# Patient Record
Sex: FEMALE | Race: WHITE | NOT HISPANIC OR LATINO | Employment: OTHER | URBAN - NONMETROPOLITAN AREA
[De-identification: names, ages, dates, MRNs, and addresses within clinical notes are randomized per-mention and may not be internally consistent; named-entity substitution may affect disease eponyms.]

---

## 2023-02-18 ENCOUNTER — OFFICE VISIT (OUTPATIENT)
Dept: URGENT CARE | Facility: CLINIC | Age: 68
End: 2023-02-18

## 2023-02-18 VITALS
WEIGHT: 239 LBS | HEART RATE: 78 BPM | SYSTOLIC BLOOD PRESSURE: 130 MMHG | DIASTOLIC BLOOD PRESSURE: 84 MMHG | RESPIRATION RATE: 18 BRPM | TEMPERATURE: 98.3 F | OXYGEN SATURATION: 97 % | BODY MASS INDEX: 50.17 KG/M2 | HEIGHT: 58 IN

## 2023-02-18 DIAGNOSIS — R05.1 ACUTE COUGH: ICD-10-CM

## 2023-02-18 DIAGNOSIS — J01.00 ACUTE MAXILLARY SINUSITIS, RECURRENCE NOT SPECIFIED: Primary | ICD-10-CM

## 2023-02-18 RX ORDER — ASPIRIN 81 MG/1
81 TABLET ORAL DAILY
COMMUNITY

## 2023-02-18 RX ORDER — BUMETANIDE 1 MG/1
1 TABLET ORAL DAILY
COMMUNITY
Start: 2023-01-03

## 2023-02-18 RX ORDER — B-COMPLEX WITH VITAMIN C
1 TABLET ORAL DAILY
COMMUNITY

## 2023-02-18 RX ORDER — AMOXICILLIN AND CLAVULANATE POTASSIUM 875; 125 MG/1; MG/1
1 TABLET, FILM COATED ORAL EVERY 12 HOURS SCHEDULED
Qty: 14 TABLET | Refills: 0 | Status: SHIPPED | OUTPATIENT
Start: 2023-02-18 | End: 2023-02-25

## 2023-02-18 RX ORDER — POTASSIUM CHLORIDE 750 MG/1
10 CAPSULE, EXTENDED RELEASE ORAL DAILY
COMMUNITY
Start: 2022-10-20

## 2023-02-18 RX ORDER — PANTOPRAZOLE SODIUM 40 MG/1
40 TABLET, DELAYED RELEASE ORAL
COMMUNITY
Start: 2023-02-17

## 2023-02-18 RX ORDER — BENZONATATE 200 MG/1
200 CAPSULE ORAL 3 TIMES DAILY PRN
Qty: 20 CAPSULE | Refills: 0 | Status: SHIPPED | OUTPATIENT
Start: 2023-02-18

## 2023-02-18 RX ORDER — FLUTICASONE PROPIONATE 50 MCG
1 SPRAY, SUSPENSION (ML) NASAL DAILY
COMMUNITY
Start: 2022-08-31

## 2023-02-18 NOTE — PROGRESS NOTES
[facility]        NAME: Blas Ramos is a 79 y o  female  : 1955    MRN: [id number]  DATE: 2023  TIME: 2:04 PM    Assessment and Plan   Acute maxillary sinusitis, recurrence not specified [J01 00]  1  Acute maxillary sinusitis, recurrence not specified  amoxicillin-clavulanate (AUGMENTIN) 875-125 mg per tablet      2  Acute cough  benzonatate (TESSALON) 200 MG capsule        Treating sinusitis with Augmentin and given Tessalon Perles to help with coughing  Vitals within normal limits and lungs sounded clear  Advised patient to return or go to the ER if symptoms worsen  Patient Instructions     Take prescribed medications as instructed  Plenty fluids and rest   Tylenol for pain or fever  Continue with over-the-counter remedies such as Mucinex and Flonase  Return or go to the ER symptoms worsen  Follow up with PCP in 3-5 days  Proceed to  ER if symptoms worsen  Chief Complaint     Chief Complaint   Patient presents with   • Cold Like Symptoms     Congestion, hoarseness, runny nose, productive cough dark green symptoms Wednesday  Taken mucinex, cough medicine, with mild relief  Denies fever, chills, body aches, n/d/v  Tested Friday for covid, negative  Denies SOB  • Cough         History of Present Illness       49-year-old female with 3 to 4 days of nasal congestion/pressure, hoarseness of the throat, coughing  PT states she has had sinus infections in the past and this feels pretty similar  Negative COVID test yesterday  Describes sinus symptoms as pressure and green discharge  Has been taking over-the-counter Mucinex and Robitussin with some relief  PT also tried Flonase at home  Denies any fever, chills, chest pain, shortness of breath, abdominal pain, body aches, nausea, vomiting, diarrhea  Review of Systems   Review of Systems   Constitutional: Negative  HENT: Positive for congestion, sinus pressure and sinus pain   Negative for ear discharge, ear pain and sore throat  Eyes: Negative  Respiratory: Positive for cough  Negative for shortness of breath and wheezing  Cardiovascular: Negative  Gastrointestinal: Negative  Musculoskeletal: Negative  Skin: Negative  Neurological: Negative  Current Medications       Current Outpatient Medications:   •  amoxicillin-clavulanate (AUGMENTIN) 875-125 mg per tablet, Take 1 tablet by mouth every 12 (twelve) hours for 7 days, Disp: 14 tablet, Rfl: 0  •  aspirin (ECOTRIN LOW STRENGTH) 81 mg EC tablet, Take 81 mg by mouth daily, Disp: , Rfl:   •  benzonatate (TESSALON) 200 MG capsule, Take 1 capsule (200 mg total) by mouth 3 (three) times a day as needed for cough, Disp: 20 capsule, Rfl: 0  •  bumetanide (BUMEX) 1 mg tablet, Take 1 mg by mouth daily, Disp: , Rfl:   •  calcium carbonate-vitamin D 500 mg-5 mcg per tablet, Take 1 tablet by mouth daily, Disp: , Rfl:   •  fluticasone (FLONASE) 50 mcg/act nasal spray, 1 spray into each nostril daily, Disp: , Rfl:   •  pantoprazole (PROTONIX) 40 mg tablet, Take 40 mg by mouth, Disp: , Rfl:   •  potassium chloride (MICRO-K) 10 MEQ CR capsule, Take 10 mEq by mouth daily, Disp: , Rfl:     Current Allergies     Allergies as of 02/18/2023 - Reviewed 02/18/2023   Allergen Reaction Noted   • Latex Hives 03/25/2016   • Nickel Hives 03/25/2016            The following portions of the patient's history were reviewed and updated as appropriate: allergies, current medications, past family history, past medical history, past social history, past surgical history and problem list      Past Medical History:   Diagnosis Date   • GERD (gastroesophageal reflux disease)        Past Surgical History:   Procedure Laterality Date   • CHOLECYSTECTOMY     • TONSILLECTOMY         No family history on file  Medications have been verified          Objective   /84   Pulse 78   Temp 98 3 °F (36 8 °C)   Resp 18   Ht 4' 10" (1 473 m)   Wt 108 kg (239 lb)   SpO2 97% BMI 49 95 kg/m²        Physical Exam     Physical Exam  Constitutional:       General: She is not in acute distress  Appearance: Normal appearance  HENT:      Head: Normocephalic and atraumatic  Right Ear: Tympanic membrane, ear canal and external ear normal       Left Ear: Tympanic membrane, ear canal and external ear normal       Nose: Congestion and rhinorrhea present  Rhinorrhea is purulent  Right Turbinates: Enlarged  Left Turbinates: Enlarged  Right Sinus: Maxillary sinus tenderness present  No frontal sinus tenderness  Left Sinus: Maxillary sinus tenderness present  No frontal sinus tenderness  Mouth/Throat:      Lips: Pink  Mouth: Mucous membranes are moist       Pharynx: Oropharynx is clear  Uvula midline  No pharyngeal swelling, oropharyngeal exudate, posterior oropharyngeal erythema or uvula swelling  Tonsils: No tonsillar exudate or tonsillar abscesses  Eyes:      Extraocular Movements: Extraocular movements intact  Conjunctiva/sclera: Conjunctivae normal       Pupils: Pupils are equal, round, and reactive to light  Cardiovascular:      Rate and Rhythm: Normal rate and regular rhythm  Heart sounds: Normal heart sounds  No murmur heard  No friction rub  No gallop  Pulmonary:      Effort: Pulmonary effort is normal  No respiratory distress  Breath sounds: Normal breath sounds  No stridor  No wheezing, rhonchi or rales  Chest:      Chest wall: No tenderness  Musculoskeletal:      Cervical back: Normal range of motion and neck supple  No tenderness  Lymphadenopathy:      Cervical: No cervical adenopathy  Skin:     General: Skin is warm and dry  Capillary Refill: Capillary refill takes less than 2 seconds  Findings: No rash  Neurological:      General: No focal deficit present  Mental Status: She is alert and oriented to person, place, and time     Psychiatric:         Mood and Affect: Mood normal          Behavior: Behavior normal

## 2023-02-18 NOTE — PATIENT INSTRUCTIONS
Take prescribed medications as instructed  Plenty fluids and rest   Tylenol for pain or fever  Continue with over-the-counter remedies such as Mucinex and Flonase  Return or go to the ER symptoms worsen  Follow up with PCP in 3-5 days  Proceed to  ER if symptoms worsen  Sinusitis   WHAT YOU NEED TO KNOW:   Sinusitis is inflammation or infection of your sinuses  Sinusitis is most often caused by a virus  Acute sinusitis may last up to 12 weeks  Chronic sinusitis lasts longer than 12 weeks  Recurrent sinusitis means you have 4 or more infections in 1 year  DISCHARGE INSTRUCTIONS:   Return to the emergency department if:   You have trouble breathing or wheezing that is getting worse  You have a stiff neck, a fever, or a bad headache  You cannot open your eye  Your eyeball bulges out or you cannot move your eye  You are more sleepy than normal, or you notice changes in your ability to think, move, or talk  You have swelling of your forehead or scalp  Call your doctor if:   You have vision changes, such as double vision  Your eye and eyelid are red, swollen, and painful  Your symptoms do not improve or go away after 10 days  You have nausea and are vomiting  Your nose is bleeding  You have questions or concerns about your condition or care  Medicines: Your symptoms may go away on their own  Your healthcare provider may recommend watchful waiting for up to 10 days before starting antibiotics  You may need any of the following:  Acetaminophen  decreases pain and fever  It is available without a doctor's order  Ask how much to take and how often to take it  Follow directions  Read the labels of all other medicines you are using to see if they also contain acetaminophen, or ask your doctor or pharmacist  Acetaminophen can cause liver damage if not taken correctly  NSAIDs , such as ibuprofen, help decrease swelling, pain, and fever   This medicine is available with or without a doctor's order  NSAIDs can cause stomach bleeding or kidney problems in certain people  If you take blood thinner medicine, always ask your healthcare provider if NSAIDs are safe for you  Always read the medicine label and follow directions  Nasal steroid sprays  may help decrease inflammation in your nose and sinuses  Decongestants  help reduce swelling and drain mucus in the nose and sinuses  They may help you breathe easier  Antihistamines  help dry mucus in the nose and relieve sneezing  Antibiotics  help treat or prevent a bacterial infection  Take your medicine as directed  Contact your healthcare provider if you think your medicine is not helping or if you have side effects  Tell your provider if you are allergic to any medicine  Keep a list of the medicines, vitamins, and herbs you take  Include the amounts, and when and why you take them  Bring the list or the pill bottles to follow-up visits  Carry your medicine list with you in case of an emergency  Self-care:   Rinse your sinuses as directed  Use a sinus rinse device to rinse your nasal passages with a saline (salt water) solution or distilled water  Do not use tap water  This will help thin the mucus in your nose and rinse away pollen and dirt  It will also help reduce swelling so you can breathe normally  Use a humidifier  to increase air moisture in your home  This may make it easier for you to breathe and help decrease your cough  Sleep with your head elevated  Place an extra pillow under your head before you go to sleep to help your sinuses drain  Drink liquids as directed  Ask your healthcare provider how much liquid to drink each day and which liquids are best for you  Liquids will thin the mucus in your nose and help it drain  Avoid drinks that contain alcohol or caffeine  Do not smoke, and avoid secondhand smoke  Nicotine and other chemicals in cigarettes and cigars can make your symptoms worse  Ask your healthcare provider for information if you currently smoke and need help to quit  E-cigarettes or smokeless tobacco still contain nicotine  Talk to your healthcare provider before you use these products  Prevent the spread of germs:   Wash your hands often with soap and water  Wash your hands after you use the bathroom, change a child's diaper, or sneeze  Wash your hands before you prepare or eat food  Stay away from people who are sick  Some germs spread easily and quickly through contact  Follow up with your doctor as directed: You may be referred to an ear, nose, and throat specialist  Write down your questions so you remember to ask them during your visits  © Copyright [employer] 2022 Information is for End User's use only and may not be sold, redistributed or otherwise used for commercial purposes  The above information is an  only  It is not intended as medical advice for individual conditions or treatments  Talk to your doctor, nurse or pharmacist before following any medical regimen to see if it is safe and effective for you  Acute Cough   WHAT YOU NEED TO KNOW:   An acute cough can last up to 3 weeks  Common causes of an acute cough include a cold, allergies, or a lung infection  DISCHARGE INSTRUCTIONS:   Return to the emergency department if:   You have trouble breathing or feel short of breath  You cough up blood, or you see blood in your mucus  You faint or feel weak or dizzy  You have chest pain when you cough or take a deep breath  You have new wheezing  Contact your healthcare provider if:   You have a fever  Your cough lasts longer than 4 weeks  Your symptoms do not improve with treatment  You have questions or concerns about your condition or care  Medicines:   Medicines  may be needed to stop the cough, decrease swelling in your airways, or help open your airways  Medicine may also be given to help you cough up mucus   Ask your healthcare provider what over-the-counter medicines you can take  If you have an infection caused by bacteria, you may need antibiotics  Take your medicine as directed  Contact your healthcare provider if you think your medicine is not helping or if you have side effects  Tell your provider if you are allergic to any medicine  Keep a list of the medicines, vitamins, and herbs you take  Include the amounts, and when and why you take them  Bring the list or the pill bottles to follow-up visits  Carry your medicine list with you in case of an emergency  Manage your symptoms:   Do not smoke and stay away from others who smoke  Nicotine and other chemicals in cigarettes and cigars can cause lung damage and make your cough worse  Ask your healthcare provider for information if you currently smoke and need help to quit  E-cigarettes or smokeless tobacco still contain nicotine  Talk to your healthcare provider before you use these products  Drink extra liquids as directed  Liquids will help thin and loosen mucus so you can cough it up  Liquids will also help prevent dehydration  Examples of good liquids to drink include water, fruit juice, and broth  Do not drink liquids that contain caffeine  Caffeine can increase your risk for dehydration  Ask your healthcare provider how much liquid to drink each day  Rest as directed  Do not do activities that make your cough worse, such as exercise  Use a humidifier or vaporizer  Use a cool mist humidifier or a vaporizer to increase air moisture in your home  This may make it easier for you to breathe and help decrease your cough  Eat 2 to 5 mL of honey 2 times each day  Honey can help thin mucus and decrease your cough  Use cough drops or lozenges  These can help decrease throat irritation and your cough  Follow up with your healthcare provider as directed:  Write down your questions so you remember to ask them during your visits    © Copyright [employer] 2022 Information is for End User's use only and may not be sold, redistributed or otherwise used for commercial purposes  The above information is an  only  It is not intended as medical advice for individual conditions or treatments  Talk to your doctor, nurse or pharmacist before following any medical regimen to see if it is safe and effective for you

## 2023-11-05 ENCOUNTER — OFFICE VISIT (OUTPATIENT)
Dept: URGENT CARE | Facility: CLINIC | Age: 68
End: 2023-11-05
Payer: MEDICARE

## 2023-11-05 ENCOUNTER — TELEPHONE (OUTPATIENT)
Dept: URGENT CARE | Facility: CLINIC | Age: 68
End: 2023-11-05

## 2023-11-05 VITALS
OXYGEN SATURATION: 99 % | RESPIRATION RATE: 18 BRPM | TEMPERATURE: 97.8 F | DIASTOLIC BLOOD PRESSURE: 62 MMHG | HEART RATE: 78 BPM | SYSTOLIC BLOOD PRESSURE: 138 MMHG

## 2023-11-05 DIAGNOSIS — J06.9 ACUTE RESPIRATORY DISEASE: Primary | ICD-10-CM

## 2023-11-05 PROCEDURE — G0463 HOSPITAL OUTPT CLINIC VISIT: HCPCS | Performed by: PHYSICIAN ASSISTANT

## 2023-11-05 PROCEDURE — 99213 OFFICE O/P EST LOW 20 MIN: CPT | Performed by: PHYSICIAN ASSISTANT

## 2023-11-05 RX ORDER — BENZONATATE 200 MG/1
200 CAPSULE ORAL 3 TIMES DAILY PRN
Qty: 20 CAPSULE | Refills: 0 | Status: SHIPPED | OUTPATIENT
Start: 2023-11-05

## 2023-11-05 RX ORDER — BENZONATATE 200 MG/1
200 CAPSULE ORAL 3 TIMES DAILY PRN
Qty: 20 CAPSULE | Refills: 0 | Status: SHIPPED | OUTPATIENT
Start: 2023-11-05 | End: 2023-11-05 | Stop reason: SDUPTHER

## 2023-11-05 RX ORDER — LEVOTHYROXINE SODIUM 0.1 MG/1
100 TABLET ORAL DAILY
COMMUNITY
Start: 2023-10-05

## 2023-11-05 NOTE — PATIENT INSTRUCTIONS
Take Tessalon as prescribed  Vitamin D3 2000 IU daily  Vitamin C 1000mg twice per day  Multivitamin daily  Fluids and rest  Nasal saline spray; Afrin if severe congestion (do not use for more than 3 days)  Over the counter decongestant  Tylenol/Ibuprofen for pain/fever  Salt water gargles and chloraseptic spray  Throat Coat Tea  Warm compresses over sinuses  Steam treatment (utilize proper safety precautions when in contact with hot water/steam)  Follow up with PCP in 3-5 days. Proceed to  ER if symptoms worsen. Eat yogurt with live and active cultures and/or take a probiotic at least 3 hours before or after antibiotic dose. Monitor stool for diarrhea and/or blood. If this occurs, contact primary care doctor ASAP.

## 2023-11-05 NOTE — PROGRESS NOTES
[facility]        NAME: Serge Greene is a 79 y.o. female  : 1955    MRN: [id number]  DATE: 2023  TIME: 10:19 AM    Assessment and Plan   Acute respiratory disease [J06.9]  1. Acute respiratory disease  benzonatate (TESSALON) 200 MG capsule            Patient Instructions     Take Tessalon as prescribed  Vitamin D3 2000 IU daily  Vitamin C 1000mg twice per day  Multivitamin daily  Fluids and rest  Nasal saline spray; Afrin if severe congestion (do not use for more than 3 days)  Over the counter decongestant  Tylenol/Ibuprofen for pain/fever  Salt water gargles and chloraseptic spray  Throat Coat Tea  Warm compresses over sinuses  Steam treatment (utilize proper safety precautions when in contact with hot water/steam)  Follow up with PCP in 3-5 days. Proceed to  ER if symptoms worsen. Eat yogurt with live and active cultures and/or take a probiotic at least 3 hours before or after antibiotic dose. Monitor stool for diarrhea and/or blood. If this occurs, contact primary care doctor ASAP. Chief Complaint     Chief Complaint   Patient presents with    Sore Throat    Cough    Nasal Congestion     Started 5 days ago  Took leftover amoxicillin, last dose taken this AM           History of Present Illness       URI   This is a new problem. The current episode started in the past 7 days. The problem has been gradually worsening. There has been no fever. Associated symptoms include congestion, coughing and a sore throat. Pertinent negatives include no abdominal pain, diarrhea, ear pain, headaches, nausea, rash, rhinorrhea, sinus pain, sneezing, vomiting or wheezing. Treatments tried: amoxicillin 500mg qdx 6 days; flonase; netti pot. Review of Systems   Review of Systems   Constitutional:  Negative for chills, fatigue and fever. HENT:  Positive for congestion, postnasal drip and sore throat.  Negative for ear discharge, ear pain, rhinorrhea, sinus pressure, sinus pain, sneezing and trouble swallowing. Respiratory:  Positive for cough. Negative for chest tightness, shortness of breath and wheezing. Gastrointestinal:  Negative for abdominal pain, diarrhea, nausea and vomiting. Musculoskeletal:  Negative for myalgias. Skin:  Negative for rash. Neurological:  Negative for light-headedness and headaches.          Current Medications       Current Outpatient Medications:     aspirin (ECOTRIN LOW STRENGTH) 81 mg EC tablet, Take 81 mg by mouth daily, Disp: , Rfl:     benzonatate (TESSALON) 200 MG capsule, Take 1 capsule (200 mg total) by mouth 3 (three) times a day as needed for cough, Disp: 20 capsule, Rfl: 0    bumetanide (BUMEX) 1 mg tablet, Take 1 mg by mouth daily, Disp: , Rfl:     calcium carbonate-vitamin D 500 mg-5 mcg per tablet, Take 1 tablet by mouth daily, Disp: , Rfl:     fluticasone (FLONASE) 50 mcg/act nasal spray, 1 spray into each nostril daily, Disp: , Rfl:     levothyroxine 100 mcg tablet, Take 100 mcg by mouth daily, Disp: , Rfl:     pantoprazole (PROTONIX) 40 mg tablet, Take 40 mg by mouth, Disp: , Rfl:     potassium chloride (MICRO-K) 10 MEQ CR capsule, Take 10 mEq by mouth daily, Disp: , Rfl:     benzonatate (TESSALON) 200 MG capsule, Take 1 capsule (200 mg total) by mouth 3 (three) times a day as needed for cough, Disp: 20 capsule, Rfl: 0    Current Allergies     Allergies as of 11/05/2023 - Reviewed 11/05/2023   Allergen Reaction Noted    Latex Hives 03/25/2016    Nickel Hives 03/25/2016            The following portions of the patient's history were reviewed and updated as appropriate: allergies, current medications, past family history, past medical history, past social history, past surgical history and problem list.     Past Medical History:   Diagnosis Date    GERD (gastroesophageal reflux disease)     Hypothyroidism     Lymphedema        Past Surgical History:   Procedure Laterality Date    CHOLECYSTECTOMY      TONSILLECTOMY         History reviewed. No pertinent family history. Medications have been verified. Objective   /62   Pulse 78   Temp 97.8 °F (36.6 °C)   Resp 18   SpO2 99%   No LMP recorded. Patient is postmenopausal.       Physical Exam     Physical Exam  Vitals reviewed. Constitutional:       General: She is not in acute distress. Appearance: She is well-developed. She is not diaphoretic. HENT:      Head: Normocephalic. Right Ear: Tympanic membrane, ear canal and external ear normal.      Left Ear: Tympanic membrane, ear canal and external ear normal.      Nose: Nose normal.      Mouth/Throat:      Pharynx: No oropharyngeal exudate or posterior oropharyngeal erythema. Tonsils: No tonsillar exudate. Eyes:      Conjunctiva/sclera: Conjunctivae normal.   Cardiovascular:      Rate and Rhythm: Normal rate and regular rhythm. Heart sounds: Normal heart sounds. No murmur heard. No friction rub. No gallop. Pulmonary:      Effort: Pulmonary effort is normal. No respiratory distress. Breath sounds: Normal breath sounds. No wheezing, rhonchi or rales. Lymphadenopathy:      Cervical: No cervical adenopathy. Skin:     General: Skin is warm. Neurological:      Mental Status: She is alert and oriented to person, place, and time. Psychiatric:         Behavior: Behavior normal.         Thought Content:  Thought content normal.         Judgment: Judgment normal.

## 2023-11-05 NOTE — TELEPHONE ENCOUNTER
Message left for the patient that the prescription ordered today at urgent care was faxed to [facility] in [place].

## 2023-12-30 ENCOUNTER — OFFICE VISIT (OUTPATIENT)
Dept: URGENT CARE | Facility: CLINIC | Age: 68
End: 2023-12-30
Payer: MEDICARE

## 2023-12-30 VITALS
RESPIRATION RATE: 18 BRPM | BODY MASS INDEX: 48.28 KG/M2 | HEIGHT: 58 IN | OXYGEN SATURATION: 96 % | WEIGHT: 230 LBS | TEMPERATURE: 98.6 F | HEART RATE: 72 BPM

## 2023-12-30 DIAGNOSIS — J01.90 ACUTE VIRAL SINUSITIS: Primary | ICD-10-CM

## 2023-12-30 DIAGNOSIS — B97.89 ACUTE VIRAL SINUSITIS: Primary | ICD-10-CM

## 2023-12-30 PROCEDURE — 99213 OFFICE O/P EST LOW 20 MIN: CPT | Performed by: PHYSICIAN ASSISTANT

## 2023-12-30 PROCEDURE — G0463 HOSPITAL OUTPT CLINIC VISIT: HCPCS | Performed by: PHYSICIAN ASSISTANT

## 2023-12-30 NOTE — PATIENT INSTRUCTIONS
Sudafed over the counter   Flonase  Warm compresses over sinuses  Steam treatment (practice proper safety precautions when handing hot liquids/steam)  Over the counter saline nasal spray  Follow up with PCP in 3-5 days.  Proceed to  ER if symptoms worsen.

## 2023-12-30 NOTE — PROGRESS NOTES
[facility]        NAME: Corinna Gross is a 68 y.o. female  : 1955    MRN: [id number]  DATE: 2023  TIME: 2:01 PM    Assessment and Plan   Acute viral sinusitis [J01.90, B97.89]  1. Acute viral sinusitis            Declined covid testing.     Patient Instructions     Sudafed over the counter   Flonase  Warm compresses over sinuses  Steam treatment (practice proper safety precautions when handing hot liquids/steam)  Over the counter saline nasal spray  Follow up with PCP in 3-5 days.  Proceed to  ER if symptoms worsen.      Chief Complaint     Chief Complaint   Patient presents with    Nasal Congestion     Patient states she had a sinus infection in November and had relief of symptoms but is now having continued and worsened symptoms. States mucus is dark in nature and states she has excessive post nasal drip during the night.          History of Present Illness       Denies heart conditions, arrhythmias or HTN.     URI   This is a new problem. Episode onset: 4 days. There has been no fever. Associated symptoms include rhinorrhea. Pertinent negatives include no abdominal pain, congestion, coughing, diarrhea, ear pain, headaches, nausea, rash, sneezing, sore throat, vomiting or wheezing. Treatments tried: flonase; netti pot; antihistamine; coricidin hbp.       Review of Systems   Review of Systems   Constitutional:  Negative for chills, fatigue and fever.   HENT:  Positive for postnasal drip, rhinorrhea, sinus pressure and voice change. Negative for congestion, ear discharge, ear pain, sneezing, sore throat and trouble swallowing.    Respiratory:  Negative for cough, chest tightness, shortness of breath and wheezing.    Gastrointestinal:  Negative for abdominal pain, diarrhea, nausea and vomiting.   Musculoskeletal:  Negative for myalgias.   Skin:  Negative for rash.   Neurological:  Negative for light-headedness and headaches.         Current Medications       Current Outpatient  "Medications:     aspirin (ECOTRIN LOW STRENGTH) 81 mg EC tablet, Take 81 mg by mouth daily, Disp: , Rfl:     bumetanide (BUMEX) 1 mg tablet, Take 1 mg by mouth daily, Disp: , Rfl:     calcium carbonate-vitamin D 500 mg-5 mcg per tablet, Take 1 tablet by mouth daily, Disp: , Rfl:     fluticasone (FLONASE) 50 mcg/act nasal spray, 1 spray into each nostril daily, Disp: , Rfl:     levothyroxine 100 mcg tablet, Take 100 mcg by mouth daily, Disp: , Rfl:     pantoprazole (PROTONIX) 40 mg tablet, Take 40 mg by mouth, Disp: , Rfl:     potassium chloride (MICRO-K) 10 MEQ CR capsule, Take 10 mEq by mouth daily, Disp: , Rfl:     benzonatate (TESSALON) 200 MG capsule, Take 1 capsule (200 mg total) by mouth 3 (three) times a day as needed for cough (Patient not taking: Reported on 12/30/2023), Disp: 20 capsule, Rfl: 0    benzonatate (TESSALON) 200 MG capsule, Take 1 capsule (200 mg total) by mouth 3 (three) times a day as needed for cough (Patient not taking: Reported on 12/30/2023), Disp: 20 capsule, Rfl: 0    Current Allergies     Allergies as of 12/30/2023 - Reviewed 12/30/2023   Allergen Reaction Noted    Latex Hives 03/25/2016    Nickel Hives 03/25/2016            The following portions of the patient's history were reviewed and updated as appropriate: allergies, current medications, past family history, past medical history, past social history, past surgical history and problem list.     Past Medical History:   Diagnosis Date    GERD (gastroesophageal reflux disease)     Hypothyroidism     Lymphedema        Past Surgical History:   Procedure Laterality Date    CHOLECYSTECTOMY      TONSILLECTOMY         No family history on file.      Medications have been verified.        Objective   Pulse 72   Temp 98.6 °F (37 °C) (Tympanic)   Resp 18   Ht 4' 10\" (1.473 m)   Wt 104 kg (230 lb)   SpO2 96%   BMI 48.07 kg/m²   No LMP recorded. Patient is postmenopausal.       Physical Exam     Physical Exam  Vitals reviewed. "   Constitutional:       General: She is not in acute distress.     Appearance: She is well-developed. She is not diaphoretic.   HENT:      Head: Normocephalic.      Right Ear: Tympanic membrane, ear canal and external ear normal.      Left Ear: Tympanic membrane, ear canal and external ear normal.      Nose: Nose normal.      Mouth/Throat:      Pharynx: No oropharyngeal exudate or posterior oropharyngeal erythema.   Eyes:      Conjunctiva/sclera: Conjunctivae normal.   Cardiovascular:      Rate and Rhythm: Normal rate and regular rhythm.      Heart sounds: Normal heart sounds. No murmur heard.     No friction rub. No gallop.   Pulmonary:      Effort: Pulmonary effort is normal. No respiratory distress.      Breath sounds: Normal breath sounds. No wheezing, rhonchi or rales.   Lymphadenopathy:      Cervical: No cervical adenopathy.   Skin:     General: Skin is warm.   Neurological:      Mental Status: She is alert and oriented to person, place, and time.   Psychiatric:         Behavior: Behavior normal.         Thought Content: Thought content normal.         Judgment: Judgment normal.